# Patient Record
Sex: MALE | URBAN - METROPOLITAN AREA
[De-identification: names, ages, dates, MRNs, and addresses within clinical notes are randomized per-mention and may not be internally consistent; named-entity substitution may affect disease eponyms.]

---

## 2021-11-30 ENCOUNTER — OFFICE VISIT (OUTPATIENT)
Dept: URBAN - METROPOLITAN AREA CLINIC 24 | Facility: CLINIC | Age: 61
End: 2021-11-30
Payer: MEDICARE

## 2021-11-30 DIAGNOSIS — H25.813 COMBINED FORMS OF AGE-RELATED CATARACT, BILATERAL: ICD-10-CM

## 2021-11-30 DIAGNOSIS — H20.041 SECONDARY NONINFECTIOUS IRIDOCYCLITIS, RIGHT EYE: Primary | ICD-10-CM

## 2021-11-30 PROCEDURE — 99204 OFFICE O/P NEW MOD 45 MIN: CPT | Performed by: OPTOMETRIST

## 2021-11-30 RX ORDER — PREDNISOLONE ACETATE 10 MG/ML
1 % SUSPENSION/ DROPS OPHTHALMIC
Qty: 5 | Refills: 3 | Status: ACTIVE
Start: 2021-11-30

## 2021-11-30 ASSESSMENT — INTRAOCULAR PRESSURE
OS: 9
OD: 9

## 2021-11-30 NOTE — IMPRESSION/PLAN
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
-- 2/2 trauma; onset 2 weeks. Plan: Stop gentamcyin Start pred acetate 1% qid od See for f/u 1 week.

## 2021-12-06 ENCOUNTER — OFFICE VISIT (OUTPATIENT)
Dept: URBAN - METROPOLITAN AREA CLINIC 24 | Facility: CLINIC | Age: 61
End: 2021-12-06
Payer: MEDICARE

## 2021-12-06 PROCEDURE — 99213 OFFICE O/P EST LOW 20 MIN: CPT | Performed by: OPTOMETRIST

## 2021-12-06 ASSESSMENT — INTRAOCULAR PRESSURE
OD: 15
OS: 15

## 2021-12-06 NOTE — IMPRESSION/PLAN
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
-- 2/2 trauma; onset 2 weeks. Plan: Significantly improved from previous. Continue pred acetate 1% tid x 1 week, bid x 1 week, qd x 1 week then d/c Notify clinic if symptoms return.

## 2021-12-06 NOTE — IMPRESSION/PLAN
Impression: Combined forms of age-related cataract, bilateral: H25.813. Plan: Early; monitor. pt edu.